# Patient Record
(demographics unavailable — no encounter records)

---

## 2024-12-05 NOTE — HISTORY OF PRESENT ILLNESS
[FreeTextEntry8] : Pt developed cough, Head congestion, chest congestion 4 days ago. The next day pt had temp 101F and again the next day. Yesterday she developed shortness of breath and chest discomfort. Tested negative for covid 2 days ago. No body aches

## 2024-12-13 NOTE — PHYSICAL EXAM
[General Appearance - Alert] : alert [General Appearance - In No Acute Distress] : in no acute distress [FreeTextEntry1] : no spinal tenderness [Oriented To Time, Place, And Person] : oriented to person, place, and time

## 2024-12-13 NOTE — DATA REVIEWED
[FreeTextEntry1] : The following labs reviewed CBC wnl CMP glucose 109; most recent A1 C 5.8 Vitamin D wnl  Ca wnl

## 2024-12-13 NOTE — ASSESSMENT
[FreeTextEntry1] : continue with home exercises; consider PT if worsening pain  continue with calcium and vitamin D supplementation  Prolia administered today (see RN note)  OV 6 months  next DEXA 8/2025

## 2024-12-13 NOTE — HISTORY OF PRESENT ILLNESS
[FreeTextEntry1] : Tolerating Prolia well  no issues with medication no joint pain or injection site reactions  history of chronic intermittent pain in the neck with improvement in PT; doing exercises at home.  s/p cold 2 weeks ago, now asymptomatic; going to be visiting her new grand baby later today.

## 2025-04-16 NOTE — HISTORY OF PRESENT ILLNESS
[FreeTextEntry8] : Pt was exposed strep through her grandchild who had strep.  It started 3 nights ago with s/t, congestion, ear clogged. Had fever. Took tylenol.